# Patient Record
Sex: FEMALE | Race: WHITE | NOT HISPANIC OR LATINO | URBAN - METROPOLITAN AREA
[De-identification: names, ages, dates, MRNs, and addresses within clinical notes are randomized per-mention and may not be internally consistent; named-entity substitution may affect disease eponyms.]

---

## 2017-04-15 ENCOUNTER — EMERGENCY (EMERGENCY)
Facility: HOSPITAL | Age: 82
LOS: 1 days | Discharge: HOME | End: 2017-04-15
Attending: EMERGENCY MEDICINE
Payer: MEDICARE

## 2017-04-15 VITALS
HEART RATE: 72 BPM | SYSTOLIC BLOOD PRESSURE: 153 MMHG | RESPIRATION RATE: 16 BRPM | TEMPERATURE: 98 F | HEIGHT: 65 IN | OXYGEN SATURATION: 98 % | DIASTOLIC BLOOD PRESSURE: 78 MMHG | WEIGHT: 199.96 LBS

## 2017-04-15 DIAGNOSIS — Z95.5 PRESENCE OF CORONARY ANGIOPLASTY IMPLANT AND GRAFT: ICD-10-CM

## 2017-04-15 DIAGNOSIS — Z79.01 LONG TERM (CURRENT) USE OF ANTICOAGULANTS: ICD-10-CM
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
## 2017-04-15 DIAGNOSIS — S52.501A UNSPECIFIED FRACTURE OF THE LOWER END OF RIGHT RADIUS, INITIAL ENCOUNTER FOR CLOSED FRACTURE: ICD-10-CM

## 2017-04-15 DIAGNOSIS — I10 ESSENTIAL (PRIMARY) HYPERTENSION: ICD-10-CM
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
## 2017-04-15 DIAGNOSIS — S52.601A UNSPECIFIED FRACTURE OF LOWER END OF RIGHT ULNA, INITIAL ENCOUNTER FOR CLOSED FRACTURE: ICD-10-CM

## 2017-04-15 PROCEDURE — 29125 APPL SHORT ARM SPLINT STATIC: CPT | Mod: RT

## 2017-04-15 PROCEDURE — 99283 EMERGENCY DEPT VISIT LOW MDM: CPT | Mod: 25

## 2017-04-15 PROCEDURE — 99284 EMERGENCY DEPT VISIT MOD MDM: CPT | Mod: 25

## 2017-04-15 PROCEDURE — 73110 X-RAY EXAM OF WRIST: CPT | Mod: 26,RT

## 2017-04-15 PROCEDURE — 29125 APPL SHORT ARM SPLINT STATIC: CPT

## 2017-04-15 PROCEDURE — 73110 X-RAY EXAM OF WRIST: CPT

## 2017-04-15 NOTE — ED PROCEDURE NOTE - NS ED PERI VASCULAR NEG
fingers/toes warm to touch/no cyanosis of extremity/no paresthesia/no swelling/capillary refill time < 2 seconds

## 2017-04-15 NOTE — ED PROVIDER NOTE - CARE PLAN
Principal Discharge DX:	Closed fracture of distal end of right radius, unspecified fracture morphology, initial encounter  Secondary Diagnosis:	Closed fracture of distal end of right ulna, unspecified fracture morphology, initial encounter

## 2017-04-15 NOTE — ED PROVIDER NOTE - ATTENDING CONTRIBUTION TO CARE
81 y.o. female normally walks with a walker, pt got up to go to bathroom ~4 am, lost her balance, fell, c/o Rt wrist pain, denies other injuries, no LOC.  PE: in distress, Rt wrist- mild deformity, tenderness to palp., pulses, sensory intact.  Pt with wrist fx, will get x-ray, pain meds

## 2017-04-15 NOTE — ED PROVIDER NOTE - OBJECTIVE STATEMENT
82 y/o female, PMHx HTN, PSx cardiac stent (1 year ago, on Plavix) BIBA for unwitnessed mechanical fall at 4am while ambulating to the bathroom without her walker. Denies LOC, syncope 82 y/o female, PMHx HTN, PSx cardiac stent (1 year ago, on Plavix) BIBA for unwitnessed mechanical fall at 4am while ambulating to the bathroom without her walker. Denies LOC, syncope, hitting head, neck pain, extremity pain, N/V, drainage from ears/nose, difficulty ambulating or any other concerns. Son confirms patient is at baseline.

## 2017-04-15 NOTE — ED PROVIDER NOTE - MEDICAL DECISION MAKING DETAILS
82 y/o female, on plavix, mechanical fall, no LOC/hit head/signs of concussion, at baseline with Son ([place] Translation), xray revealed distal nondisplaced ulnar/radial fx, volar splint, follow up with Ortho. Ortho was not available to consult, Dr. Rodgers on call.

## 2017-04-15 NOTE — ED ADULT NURSE NOTE - OBJECTIVE STATEMENT
Patient with family at bedside, states patient fell at home 4 am today. Denies chest pain, dizziness, headache at this time. Slight swelling noted to right wrist. Right arm in splint by EMS.